# Patient Record
Sex: FEMALE | Race: WHITE | NOT HISPANIC OR LATINO | Employment: FULL TIME | URBAN - METROPOLITAN AREA
[De-identification: names, ages, dates, MRNs, and addresses within clinical notes are randomized per-mention and may not be internally consistent; named-entity substitution may affect disease eponyms.]

---

## 2020-05-25 ENCOUNTER — APPOINTMENT (OUTPATIENT)
Dept: GENERAL RADIOLOGY | Facility: HOSPITAL | Age: 45
End: 2020-05-25

## 2020-05-25 ENCOUNTER — HOSPITAL ENCOUNTER (EMERGENCY)
Facility: HOSPITAL | Age: 45
Discharge: HOME | End: 2020-05-25
Attending: EMERGENCY MEDICINE | Admitting: EMERGENCY MEDICINE

## 2020-05-25 ENCOUNTER — APPOINTMENT (OUTPATIENT)
Dept: CARDIOLOGY | Facility: HOSPITAL | Age: 45
End: 2020-05-25

## 2020-05-25 VITALS
DIASTOLIC BLOOD PRESSURE: 82 MMHG | RESPIRATION RATE: 16 BRPM | HEIGHT: 67 IN | WEIGHT: 293 LBS | SYSTOLIC BLOOD PRESSURE: 129 MMHG | OXYGEN SATURATION: 98 % | HEART RATE: 83 BPM | TEMPERATURE: 98.3 F | BODY MASS INDEX: 45.99 KG/M2

## 2020-05-25 DIAGNOSIS — M23.91 ACUTE INTERNAL DERANGEMENT OF RIGHT KNEE: Primary | ICD-10-CM

## 2020-05-25 LAB
BH CV ECHO MEAS - BSA(HAYCOCK): 2.7 M^2
BH CV ECHO MEAS - BSA: 2.4 M^2
BH CV ECHO MEAS - BZI_BMI: 46.4 KILOGRAMS/M^2
BH CV ECHO MEAS - BZI_METRIC_HEIGHT: 172.7 CM
BH CV ECHO MEAS - BZI_METRIC_WEIGHT: 138.3 KG
BH CV LOWER VASCULAR LEFT COMMON FEMORAL AUGMENT: NORMAL
BH CV LOWER VASCULAR LEFT COMMON FEMORAL COMPRESS: NORMAL
BH CV LOWER VASCULAR LEFT COMMON FEMORAL PHASIC: NORMAL
BH CV LOWER VASCULAR LEFT COMMON FEMORAL SPONT: NORMAL
BH CV LOWER VASCULAR RIGHT COMMON FEMORAL AUGMENT: NORMAL
BH CV LOWER VASCULAR RIGHT COMMON FEMORAL COMPRESS: NORMAL
BH CV LOWER VASCULAR RIGHT COMMON FEMORAL PHASIC: NORMAL
BH CV LOWER VASCULAR RIGHT COMMON FEMORAL SPONT: NORMAL
BH CV LOWER VASCULAR RIGHT DISTAL FEMORAL AUGMENT: NORMAL
BH CV LOWER VASCULAR RIGHT DISTAL FEMORAL COMPRESS: NORMAL
BH CV LOWER VASCULAR RIGHT DISTAL FEMORAL PHASIC: NORMAL
BH CV LOWER VASCULAR RIGHT DISTAL FEMORAL SPONT: NORMAL
BH CV LOWER VASCULAR RIGHT GASTRONEMIUS AUGMENT: NORMAL
BH CV LOWER VASCULAR RIGHT GASTRONEMIUS COMPRESS: NORMAL
BH CV LOWER VASCULAR RIGHT GASTRONEMIUS PHASIC: NORMAL
BH CV LOWER VASCULAR RIGHT GASTRONEMIUS SPONT: NORMAL
BH CV LOWER VASCULAR RIGHT GREATER SAPH AK AUGMENT: NORMAL
BH CV LOWER VASCULAR RIGHT GREATER SAPH AK COMPRESS: NORMAL
BH CV LOWER VASCULAR RIGHT GREATER SAPH AK PHASIC: NORMAL
BH CV LOWER VASCULAR RIGHT GREATER SAPH AK SPONT: NORMAL
BH CV LOWER VASCULAR RIGHT GREATER SAPH BK AUGMENT: NORMAL
BH CV LOWER VASCULAR RIGHT GREATER SAPH BK COMPRESS: NORMAL
BH CV LOWER VASCULAR RIGHT GREATER SAPH BK PHASIC: NORMAL
BH CV LOWER VASCULAR RIGHT GREATER SAPH BK SPONT: NORMAL
BH CV LOWER VASCULAR RIGHT LESSER SAPH AUGMENT: NORMAL
BH CV LOWER VASCULAR RIGHT LESSER SAPH COMPRESS: NORMAL
BH CV LOWER VASCULAR RIGHT LESSER SAPH PHASIC: NORMAL
BH CV LOWER VASCULAR RIGHT LESSER SAPH SPONT: NORMAL
BH CV LOWER VASCULAR RIGHT MID FEMORAL AUGMENT: NORMAL
BH CV LOWER VASCULAR RIGHT MID FEMORAL COMPRESS: NORMAL
BH CV LOWER VASCULAR RIGHT MID FEMORAL PHASIC: NORMAL
BH CV LOWER VASCULAR RIGHT MID FEMORAL SPONT: NORMAL
BH CV LOWER VASCULAR RIGHT PERONEAL AUGMENT: NORMAL
BH CV LOWER VASCULAR RIGHT PERONEAL COMPRESS: NORMAL
BH CV LOWER VASCULAR RIGHT PERONEAL PHASIC: NORMAL
BH CV LOWER VASCULAR RIGHT PERONEAL SPONT: NORMAL
BH CV LOWER VASCULAR RIGHT POPLITEAL AUGMENT: NORMAL
BH CV LOWER VASCULAR RIGHT POPLITEAL COMPRESS: NORMAL
BH CV LOWER VASCULAR RIGHT POPLITEAL PHASIC: NORMAL
BH CV LOWER VASCULAR RIGHT POPLITEAL SPONT: NORMAL
BH CV LOWER VASCULAR RIGHT POSTERIOR TIBIAL AUGMENT: NORMAL
BH CV LOWER VASCULAR RIGHT POSTERIOR TIBIAL COMPRESS: NORMAL
BH CV LOWER VASCULAR RIGHT POSTERIOR TIBIAL PHASIC: NORMAL
BH CV LOWER VASCULAR RIGHT POSTERIOR TIBIAL SPONT: NORMAL
BH CV LOWER VASCULAR RIGHT PROFUNDA FEMORAL AUGMENT: NORMAL
BH CV LOWER VASCULAR RIGHT PROFUNDA FEMORAL COMPRESS: NORMAL
BH CV LOWER VASCULAR RIGHT PROFUNDA FEMORAL PHASIC: NORMAL
BH CV LOWER VASCULAR RIGHT PROFUNDA FEMORAL SPONT: NORMAL
BH CV LOWER VASCULAR RIGHT PROXIMAL FEMORAL AUGMENT: NORMAL
BH CV LOWER VASCULAR RIGHT PROXIMAL FEMORAL COMPRESS: NORMAL
BH CV LOWER VASCULAR RIGHT PROXIMAL FEMORAL PHASIC: NORMAL
BH CV LOWER VASCULAR RIGHT PROXIMAL FEMORAL SPONT: NORMAL
BH CV LOWER VASCULAR RIGHT SAPHENOFEMORAL JUNCTION AUGMENT: NORMAL
BH CV LOWER VASCULAR RIGHT SAPHENOFEMORAL JUNCTION COMPRESS: NORMAL
BH CV LOWER VASCULAR RIGHT SAPHENOFEMORAL JUNCTION PHASIC: NORMAL
BH CV LOWER VASCULAR RIGHT SAPHENOFEMORAL JUNCTION SPONT: NORMAL
BH CV LOWER VASCULAR RIGHT SOLEAL AUGMENT: NORMAL
BH CV LOWER VASCULAR RIGHT SOLEAL COMPRESS: NORMAL
BH CV LOWER VASCULAR RIGHT SOLEAL PHASIC: NORMAL
BH CV LOWER VASCULAR RIGHT SOLEAL SPONT: NORMAL

## 2020-05-25 PROCEDURE — 73560 X-RAY EXAM OF KNEE 1 OR 2: CPT

## 2020-05-25 PROCEDURE — 93971 EXTREMITY STUDY: CPT

## 2020-05-25 PROCEDURE — 99284 EMERGENCY DEPT VISIT MOD MDM: CPT

## 2020-05-25 PROCEDURE — 93971 EXTREMITY STUDY: CPT | Performed by: INTERNAL MEDICINE

## 2020-05-25 NOTE — ED PROVIDER NOTES
Subjective   Ms. Romero 45-year-old female presents to the emergency room today having pain in her right knee.  She states the pain is more severe when she bears weight, she had had an injury years ago but never had it followed up on.  She states that she was seen at the [facility] today they sent her here to rule out a blood clot.  Patient states she is never had a blood clot.  She does not smoke she does not have a history of birth control she does not have a history of cancer is not been immobilized.  Her pain is worse with weightbearing and some range of motion there is no crepitus but has felt unstable.      History provided by:  Patient   used: No    Knee Pain   Location:  Knee  Injury: no    Pain details:     Quality:  Aching and dull    Radiates to:  Does not radiate    Severity:  Moderate    Onset quality:  Gradual    Timing:  Intermittent    Progression:  Waxing and waning  Chronicity:  New  Dislocation: no    Foreign body present:  No foreign bodies  Tetanus status:  Unknown  Prior injury to area:  Yes  Relieved by:  Nothing  Worsened by:  Bearing weight  Ineffective treatments:  None tried  Associated symptoms: no decreased ROM, no fever, no itching, no neck pain, no stiffness, no swelling and no tingling    Risk factors: obesity    Risk factors: no frequent fractures and no known bone disorder        Review of Systems   Constitutional: Negative for chills and fever.   Respiratory: Negative for chest tightness, shortness of breath and wheezing.    Cardiovascular: Negative for chest pain and palpitations.   Gastrointestinal: Negative for abdominal pain and vomiting.   Musculoskeletal: Negative for neck pain and stiffness.   Skin: Negative for itching, pallor and rash.   Hematological: Negative for adenopathy.   Psychiatric/Behavioral: Negative.    All other systems reviewed and are negative.      History reviewed. No pertinent past medical history.    Allergies   Allergen Reactions   •  Penicillins Hives       Past Surgical History:   Procedure Laterality Date   • BREAST SURGERY         History reviewed. No pertinent family history.    Social History     Socioeconomic History   • Marital status: Single     Spouse name: Not on file   • Number of children: Not on file   • Years of education: Not on file   • Highest education level: Not on file   Tobacco Use   • Smoking status: Current Every Day Smoker   • Smokeless tobacco: Never Used   Substance and Sexual Activity   • Alcohol use: Yes   • Drug use: Defer   • Sexual activity: Defer           Objective   Physical Exam   Constitutional: She is oriented to person, place, and time. She appears well-developed and well-nourished. No distress.   HENT:   Head: Normocephalic and atraumatic.   Nose: Nose normal.   Eyes: Conjunctivae are normal. No scleral icterus.   Neck: Normal range of motion. Neck supple.   Pulmonary/Chest: No respiratory distress.   Musculoskeletal: Normal range of motion.   Negative anterior drawer sign.  There is no laxity with valgus or varus.  Posterior tib dorsalis pedis pulses are palpable.  Cap refill less than 2 seconds.   Neurological: She is alert and oriented to person, place, and time.   Skin: Skin is warm and dry. Capillary refill takes less than 2 seconds. She is not diaphoretic.   Psychiatric: She has a normal mood and affect. Her behavior is normal.   Nursing note and vitals reviewed.      Procedures           ED Course  ED Course as of May 25 1411   Mon May 25, 2020   1408 Duplex Doppler was negative.  Yashira the findings with the patient this sounds more like a meniscus type injury with her having pain.  If she is unable to bear weight we will give her crutches will give her NSAIDs have her follow-up with orthopedics.    [PEGGY]      ED Course User Index  [PEGGY] Grayson Juarez PA                                           MDM  Number of Diagnoses or Management Options  Acute internal derangement of right knee: new and  requires workup     Amount and/or Complexity of Data Reviewed  Tests in the radiology section of CPT®: ordered and reviewed  Discuss the patient with other providers: yes    Patient Progress  Patient progress: stable      Final diagnoses:   Acute internal derangement of right knee       Recent Results (from the past 24 hour(s))   Duplex Venous Lower Extremity - Right CAR    Collection Time: 05/25/20  1:46 PM   Result Value Ref Range    BSA 2.4 m^2     CV ECHO RED - BZI_BMI 46.4 kilograms/m^2     CV ECHO RED - BSA([place]) 2.7 m^2     CV ECHO RED - BZI_METRIC_WEIGHT 138.3 kg     CV ECHO RED - BZI_METRIC_HEIGHT 172.7 cm    Right Common Femoral Spont Y     Right Common Femoral Phasic Y     Right Common Femoral Augment Y     Right Common Femoral Compress C     Right Saphenofemoral Junction Spont Y     Right Saphenofemoral Junction Phasic Y     Right Saphenofemoral Junction Augment Y     Right Saphenofemoral Junction Compress C     Right Profunda Femoral Spont Y     Right Profunda Femoral Phasic Y     Right Profunda Femoral Augment Y     Right Profunda Femoral Compress C     Right Proximal Femoral Spont Y     Right Proximal Femoral Phasic Y     Right Proximal Femoral Augment Y     Right Proximal Femoral Compress C     Right Mid Femoral Spont Y     Right Mid Femoral Phasic Y     Right Mid Femoral Augment Y     Right Mid Femoral Compress C     Right Distal Femoral Spont Y     Right Distal Femoral Phasic Y     Right Distal Femoral Augment Y     Right Distal Femoral Compress C     Right Popliteal Spont Y     Right Popliteal Phasic Y     Right Popliteal Augment Y     Right Popliteal Compress C     Right Posterior Tibial Spont Y     Right Posterior Tibial Phasic Y     Right Posterior Tibial Augment Y     Right Posterior Tibial Compress C     Right Peroneal Spont Y     Right Peroneal Phasic Y     Right Peroneal Augment Y     Right Peroneal Compress C     Right GastronemiusSoleal Spont Y     Right GastronemiusSoleal  "Phasic Y     Right GastronemiusSoleal Augment Y     Right GastronemiusSoleal Compress C     Right Greater Saph AK Spont Y     Right Greater Saph AK Phasic Y     Right Greater Saph AK Augment Y     Right Greater Saph AK Compress C     Right Greater Saph BK Spont Y     Right Greater Saph BK Phasic Y     Right Greater Saph BK Augment Y     Right Greater Saph BK Compress C     Right Lesser Saph Spont Y     Right Lesser Saph Phasic Y     Right Lesser Saph Augment Y     Right Lesser Saph Compress C     Left Common Femoral Spont Y     Left Common Femoral Phasic Y     Left Common Femoral Augment Y     Left Common Femoral Compress C     RIGHT SOLEAL SPONT Y     RIGHT SOLEAL PHASIC Y     BH CV LOWER VASCULAR RIGHT SOLEAL AUGMENT Y     BH CV LOWER VASCULAR RIGHT SOLEAL COMPRESS C      Note: In addition to lab results from this visit, the labs listed above may include labs taken at another facility or during a different encounter within the last 24 hours. Please correlate lab times with ED admission and discharge times for further clarification of the services performed during this visit.    XR Knee 1 or 2 View Right   Final Result   No acute osseous finding specifically, no acute fracture,   however, moderate-to-large suprapatellar joint effusion noted.       D:  05/25/2020   E:  05/25/2020       This report was finalized on 5/25/2020 1:40 PM by Dr. Eduardo Oliva.            Vitals:    05/25/20 1200 05/25/20 1215 05/25/20 1347 05/25/20 1400   BP: 140/86   129/82   BP Location: Right arm   Right arm   Pulse: 80   83   Resp: 16   16   Temp:       TempSrc: Oral      SpO2: 97% 98%  95%   Weight:   (!) 138 kg (305 lb)    Height:   170 cm (66.93\")      Medications - No data to display  ECG/EMG Results (last 24 hours)     ** No results found for the last 24 hours. **        No orders to display            Grayson Juarez PA  05/28/20 0717    "

## 2021-09-08 ENCOUNTER — OFFICE VISIT (OUTPATIENT)
Dept: INTERNAL MEDICINE | Facility: CLINIC | Age: 46
End: 2021-09-08

## 2021-09-08 VITALS
HEART RATE: 83 BPM | RESPIRATION RATE: 16 BRPM | OXYGEN SATURATION: 98 % | SYSTOLIC BLOOD PRESSURE: 142 MMHG | TEMPERATURE: 97.3 F | BODY MASS INDEX: 62 KG/M2 | DIASTOLIC BLOOD PRESSURE: 102 MMHG | WEIGHT: 293 LBS

## 2021-09-08 DIAGNOSIS — M25.561 ACUTE PAIN OF RIGHT KNEE: Primary | ICD-10-CM

## 2021-09-08 DIAGNOSIS — R03.0 ELEVATED BLOOD PRESSURE READING: ICD-10-CM

## 2021-09-08 PROCEDURE — 99214 OFFICE O/P EST MOD 30 MIN: CPT | Performed by: NURSE PRACTITIONER

## 2021-09-08 NOTE — PATIENT INSTRUCTIONS
Advance Care Planning and Advance Directives     You make decisions on a daily basis - decisions about where you want to live, your career, your home, your life. Perhaps one of the most important decisions you face is your choice for future medical care. Take time to talk with your family and your healthcare team and start planning today.  Advance Care Planning is a process that can help you:  · Understand possible future healthcare decisions in light of your own experiences  · Reflect on those decision in light of your goals and values  · Discuss your decisions with those closest to you and the healthcare professionals that care for you  · Make a plan by creating a document that reflects your wishes    Surrogate Decision Maker  In the event of a medical emergency, which has left you unable to communicate or to make your own decisions, you would need someone to make decisions for you.  It is important to discuss your preferences for medical treatment with this person while you are in good health.     Qualities of a surrogate decision maker:  • Willing to take on this role and responsibility  • Knows what you want for future medical care  • Willing to follow your wishes even if they don't agree with them  • Able to make difficult medical decisions under stressful circumstances    Advance Directives  These are legal documents you can create that will guide your healthcare team and decision maker(s) when needed. These documents can be stored in the electronic medical record.    · Living Will - a legal document to guide your care if you have a terminal condition or a serious illness and are unable to communicate. States vary by statute in document names/types, but most forms may include one or more of the following:        -  Directions regarding life-prolonging treatments        -  Directions regarding artificially provided nutrition/hydration        -  Choosing a healthcare decision maker        -  Direction  regarding organ/tissue donation    · Durable Power of  for Healthcare - this document names an -in-fact to make medical decisions for you, but it may also allow this person to make personal and financial decisions for you. Please seek the advice of an  if you need this type of document.    **Advance Directives are not required and no one may discriminate against you if you do not sign one.    Medical Orders  Many states allow specific forms/orders signed by your physician to record your wishes for medical treatment in your current state of health. This form, signed in personal communication with your physician, addresses resuscitation and other medical interventions that you may or may not want.      For more information or to schedule a time with a [facility] Advance Care Planning Facilitator contact: [facility] or call [phone] and someone will contact you directly.    MyPlate from USDA    MyPlate is an outline of a general healthy diet based on the 2010 Dietary Guidelines for Americans, from the U.S. Department of Agriculture (USDA). It sets guidelines for how much food you should eat from each food group based on your age, sex, and level of physical activity.  What are tips for following MyPlate?  To follow MyPlate recommendations:  · Eat a wide variety of fruits and vegetables, grains, and protein foods.  · Serve smaller portions and eat less food throughout the day.  · Limit portion sizes to avoid overeating.  · Enjoy your food.  · Get at least 150 minutes of exercise every week. This is about 30 minutes each day, 5 or more days per week.  It can be difficult to have every meal look like MyPlate. Think about MyPlate as eating guidelines for an entire day, rather than each individual meal.  Fruits and vegetables  · Make half of your plate fruits and vegetables.  · Eat many different colors of fruits and vegetables each day.  · For a 2,000 calorie daily food plan,  eat:  ? 2½ cups of vegetables every day.  ? 2 cups of fruit every day.  · 1 cup is equal to:  ? 1 cup raw or cooked vegetables.  ? 1 cup raw fruit.  ? 1 medium-sized orange, apple, or banana.  ? 1 cup 100% fruit or vegetable juice.  ? 2 cups raw leafy greens, such as lettuce, spinach, or kale.  ? ½ cup dried fruit.  Grains  · One fourth of your plate should be grains.  · Make at least half of the grains you eat each day whole grains.  · For a 2,000 calorie daily food plan, eat 6 oz of grains every day.  · 1 oz is equal to:  ? 1 slice bread.  ? 1 cup cereal.  ? ½ cup cooked rice, cereal, or pasta.  Protein  · One fourth of your plate should be protein.  · Eat a wide variety of protein foods, including meat, poultry, fish, eggs, beans, nuts, and tofu.  · For a 2,000 calorie daily food plan, eat 5½ oz of protein every day.  · 1 oz is equal to:  ? 1 oz meat, poultry, or fish.  ? ¼ cup cooked beans.  ? 1 egg.  ? ½ oz nuts or seeds.  ? 1 Tbsp peanut butter.  Dairy  · Drink fat-free or low-fat (1%) milk.  · Eat or drink dairy as a side to meals.  · For a 2,000 calorie daily food plan, eat or drink 3 cups of dairy every day.  · 1 cup is equal to:  ? 1 cup milk, yogurt, cottage cheese, or soy milk (soy beverage).  ? 2 oz processed cheese.  ? 1½ oz natural cheese.  Fats, oils, salt, and sugars  · Only small amounts of oils are recommended.  · Avoid foods that are high in calories and low in nutritional value (empty calories), like foods high in fat or added sugars.  · Choose foods that are low in salt (sodium). Choose foods that have less than 140 milligrams (mg) of sodium per serving.  · Drink water instead of sugary drinks. Drink enough water each day to keep your urine pale yellow.  Where to find support  · Work with your health care provider or a nutrition specialist (dietitian) to develop a customized eating plan that is right for you.  · Download an tati (mobile application) to help you track your daily food  intake.  Where to find more information  · Go to ChooseMyPlate.gov for more information.  Summary  · MyPlate is a general guideline for healthy eating from the USDA. It is based on the 2010 Dietary Guidelines for Americans.  · In general, fruits and vegetables should take up ½ of your plate, grains should take up ¼ of your plate, and protein should take up ¼ of your plate.  This information is not intended to replace advice given to you by your health care provider. Make sure you discuss any questions you have with your health care provider.  Document Revised: 05/21/2020 Document Reviewed: 03/19/2018  Elsevier Patient Education © 2021 Elsevier Inc.    ACP information pamphlet given to the patient.  ACP information provided on the AVS.

## 2021-09-08 NOTE — PROGRESS NOTES
swimmin gloast 42 # in last 6 mo   renan fell off toilet tryin to kill spider and reinjured 1 yr ago and over compasate to L side then L side sore and   1 yr ago tryin gto get a dog out of pool  Given nsaids and roatated motrin and tylenol.   Seen in er no dvt xray normal  Derm referral   Medrol pt for knee   B knee with fuid R>L  R knner knee change white to red 4mm neds derm   bp recheck one month

## 2021-09-08 NOTE — PROGRESS NOTES
Chief Complaint  Joint Swelling (Rt knee. Establish care)    Subjective          Neida Romero presents to [facility]  History of Present Illness  The patient comes to clinic today to establish care.    Patient has new symptom today right knee pain swelling    Past medical history includes GERD which is not exacerbated takes medication as needed over-the-counter..    The patient is notable she is here with her blood pressure is elevated she is had mild elevations in the past.  She denies chest pain shortness of breath headache dizziness palpitation visual changes or presyncope or syncope episodes.    Allergies to penicillin    Health screenings ophthalmology exam 6/21  Occupation lead  single 5 drinks per week no illicit drug use current smoker since 2000 1/3 pack cigarettes daily    Surgical history includes breast reduction 2001    Family medical history includes grandparent with arthritis and mother with diabetes    Review of systems no headache dizziness passing out chest pain shortness of breath or swelling no visual changes no palpitations no abdominal symptoms no new lumps bumps or rashes she does have positive right knee pain.  Objective   Vital Signs:   BP (!) 142/102 (BP Location: Right arm, Patient Position: Sitting, Cuff Size: Large Adult) Comment: rechecked Lt arm sitting 132/94  Pulse 83   Temp 97.3 °F (36.3 °C) (Temporal)   Resp 16   Wt (!) 179 kg (395 lb)   SpO2 98%   BMI 62.00 kg/m²     Physical Exam  Vitals and nursing note reviewed.   Constitutional:       Appearance: She is well-developed.   HENT:      Head: Normocephalic and atraumatic.   Cardiovascular:      Rate and Rhythm: Normal rate and regular rhythm.   Pulmonary:      Effort: Pulmonary effort is normal.      Breath sounds: Normal breath sounds.   Musculoskeletal:      Comments: Right knee noted positive swelling negative valgus varus negative anterior posterior  drawer sign.  Patient is able to bear weight on the knee.  There is no warmth or erythema no bruising.    Left knee with positive swelling noted as well.   Skin:     General: Skin is warm and dry.      Capillary Refill: Capillary refill takes 2 to 3 seconds.   Neurological:      Mental Status: She is alert and oriented to person, place, and time.   Psychiatric:         Behavior: Behavior normal.        Result Review :                 Assessment and Plan    Diagnoses and all orders for this visit:    1. Acute pain of right knee (Primary)  -     XR Knee 1 or 2 View Right; Future  -     Ambulatory Referral to Physical Therapy    Other orders  -     methylPREDNISolone (MEDROL) 4 MG dose pack; Take as directed on package instructions.  Dispense: 1 each; Refill: 0      Discussed with patient option of having possible orthopedic evaluation or MRI if symptoms not improving she will let us know.    Blood pressure is elevated today however the last 2 blood pressure checks she has had has been when she is sick or hurting.  Of advised her to check her blood pressure at home and if her numbers are 90 normal range she will let us know.  Encouraged a DASH diet.  Follow-up in the next few weeks for fasting physical recheck of blood pressure and need.          Discussed the patient's BMI with her. BMI is above normal parameters. Recommendations include: educational material.    AWV: N/A  A1C: No results found for: HGBA1C   ACP: Advance Care Planning   ACP discussion was held with the patient during this visit. Patient does not have an advance directive, information provided.       Follow Up   Return in about 1 month (around 10/8/2021) for fasting.  Patient was given instructions and counseling regarding her condition or for health maintenance advice. Please see specific information pulled into the AVS if appropriate.     RTC/call  If symptoms worsen  Meds MOA and SE's reviewed and pt v/u

## 2021-09-09 ENCOUNTER — TELEPHONE (OUTPATIENT)
Dept: INTERNAL MEDICINE | Facility: CLINIC | Age: 46
End: 2021-09-09

## 2021-09-09 RX ORDER — METHYLPREDNISOLONE 4 MG/1
TABLET ORAL
Qty: 1 EACH | Refills: 0 | Status: SHIPPED | OUTPATIENT
Start: 2021-09-09 | End: 2021-10-20

## 2021-09-09 NOTE — TELEPHONE ENCOUNTER
PT CALLED IN REGARDS TO A MEDICATION  THAT WAS SUPPOSE TO BE SENT TO PHARMACY FOR SWELLING IN PT KNEE.    PLEASE ADVISE.  CALL BACK:[phone]    [facility]/pharmacy #[id number] - [place], [place] - [address]

## 2021-09-10 ENCOUNTER — HOSPITAL ENCOUNTER (OUTPATIENT)
Dept: PHYSICAL THERAPY | Facility: HOSPITAL | Age: 46
Setting detail: THERAPIES SERIES
Discharge: HOME | End: 2021-09-10

## 2021-09-10 DIAGNOSIS — M25.561 ACUTE PAIN OF RIGHT KNEE: Primary | ICD-10-CM

## 2021-09-10 PROCEDURE — 97161 PT EVAL LOW COMPLEX 20 MIN: CPT

## 2021-09-10 NOTE — THERAPY EVALUATION
Outpatient Physical Therapy Ortho Initial Evaluation  [facility]     Patient Name: Neida Romero  : 1975  MRN: [id number]  Today's Date: 9/10/2021      Visit Date: 09/10/2021    There is no problem list on file for this patient.       Past Medical History:   Diagnosis Date   • Acid reflux         Past Surgical History:   Procedure Laterality Date   • BREAST SURGERY         Visit Dx:     ICD-10-CM ICD-9-CM   1. Acute pain of right knee  M25.561 719.46         Patient History     Row Name 09/10/21 1100             History    Chief Complaint  Pain  -LF      Type of Pain  Knee pain  -LF      Brief Description of Current Complaint  Neida presents with ongoing right knee pain.  She says she initially injured her knee 10 years ago when she lost balance and landed on it.  Then reinjured it last May when she slipped in a pool and krystal her knee.  Had increased swelling at the time.  Went to [facility] and ER and work-up showed no fracture or DVT.  Symptoms unchanged since onset.  Constant pain, but intensity varies.   -LF      Patient/Caregiver Goals  Relieve pain;Return to prior level of function;Decrease swelling  -LF      Occupation/sports/leisure activities   at [employer]; has been trying to walk regularly to help with weight loss  -LF         Pain     Pain Location  Knee  -LF      Pain at Present  2  -LF      Pain at Worst  6;7  -LF      Pain Frequency  Constant/continuous  -LF      Pain Description  Aching;Discomfort;Sharp;Dull fatigue  -LF      What Performance Factors Make the Current Problem(s) WORSE?  walking, squatting, pressure, prolonged standing, stairs  -LF      What Performance Factors Make the Current Problem(s) BETTER?  tylenol and ibuprofen, ice  -LF         Fall Risk Assessment    Any falls in the past year:  No  -LF         Daily Activities    Primary Language  English  -LF      Are you able to read  Yes  -LF      Are you able to write  Yes  -LF      Barriers to learning  None  -LF       Pt Participated in POC and Goals  Yes  -LF        User Key  (r) = Recorded By, (t) = Taken By, (c) = Cosigned By    Initials Name Provider Type    LF Almaz Gonzalez PT Physical Therapist          PT Ortho     Row Name 09/10/21 1100       Special Tests/Palpation    Special Tests/Palpation  Knee  -LF       Knee Special Tests    Anterior drawer (ACL lesion)  Right:;Negative  -LF    Posterior drawer (PCL lesion)  Right:;Negative  -LF    Valgus stress (MCL lesion)  Right:;Negative  -LF    Varus stress (LCL lesion)  Right:;Negative  -LF    Deysi’s test (meniscal lesion)  Right:;Negative  -LF    Patellar grind test (chondromalacia patella)  Right:;Negative  -LF    Knee Special Tests Comments  No tenderness to palpation at knee in neutral or flexed position.  Mild pocket edema right medial knee  -LF       General ROM    RT Lower Ext  Rt Knee Extension/Flexion  -LF    LT Lower Ext  Lt Knee Extension/Flexion  -LF       Right Lower Ext    Rt Knee Extension/Flexion AROM  0-  -LF       Left Lower Ext    Lt Knee Extension/Flexion AROM  0-118  -LF       MMT (Manual Muscle Testing)    Rt Lower Ext  Rt Hip Flexion;Rt Hip ABduction;Rt Hip ADduction;Rt Knee Extension;Rt Knee Flexion;Rt Ankle Plantarflexion;Rt Ankle Dorsiflexion  -LF    Lt Lower Ext  Lt Hip Flexion;Lt Hip ABduction;Lt Hip ADduction;Lt Knee Extension;Lt Knee Flexion;Lt Ankle Plantarflexion;Lt Ankle Dorsiflexion  -LF       MMT Right Lower Ext    Rt Hip Flexion MMT, Gross Movement  (4-/5) good minus  -LF    Rt Hip ABduction MMT, Gross Movement  (4+/5) good plus  -LF    Rt Hip ADduction MMT, Gross Movement  (4+/5) good plus  -LF    Rt Knee Extension MMT, Gross Movement  (5/5) normal  -LF    Rt Knee Flexion MMT, Gross Movement  (5/5) normal  -LF    Rt Ankle Plantarflexion MMT, Gross Movement  (5/5) normal  -LF    Rt Ankle Dorsiflexion MMT, Gross Movement  (5/5) normal  -LF       MMT Left Lower Ext    Lt Hip Flexion MMT, Gross Movement  (4-/5) good minus  -LF     Lt Hip ABduction MMT, Gross Movement  (4+/5) good plus  -LF    Lt Hip ADduction MMT, Gross Movement  (4+/5) good plus  -LF    Lt Knee Extension MMT, Gross Movement  (5/5) normal  -LF    Lt Knee Flexion MMT, Gross Movement  (5/5) normal  -LF       Sensation    Sensation WNL?  WNL  -LF       Flexibility    Flexibility Tested?  Lower Extremity  -LF       Lower Extremity Flexibility    Hamstrings  Right:;Mildly limited  -LF    Quadriceps  Right:;Mildly limited  -LF      User Key  (r) = Recorded By, (t) = Taken By, (c) = Cosigned By    Initials Name Provider Type    LF Almaz Gonzalez, PT Physical Therapist            Therapy Education  Education Details: HEP provided for QS, SLR, heel slides, HS stretch  Given: HEP, Symptoms/condition management  Program: New  How Provided: Verbal, Demonstration, Written  Provided to: Patient  Level of Understanding: Teach back education performed, Verbalized, Demonstrated     PT OP Goals     Row Name 09/10/21 1100          PT Short Term Goals    STG Date to Achieve  10/01/21  -LF     STG 1  Patient to report 50% improvement in knee pain.  -LF     STG 1 Progress  New  -LF     STG 2  R knee ROM improved to 0-115 to promote improved gait and mobility.  -LF     STG 2 Progress  New  -LF        Long Term Goals    LTG Date to Achieve  11/05/21  -LF     LTG 1  Patient independent with HEP for management of symptoms included ther ex  for improved strength and flexbility.  -LF     LTG 1 Progress  New  -LF     LTG 2  LEFS score improved by at least 15 points.  -LF     LTG 2 Progress  New  -LF     LTG 3  Patient able to stand/walk at least 20 minutes with knee pain no greater than 2/10  -LF     LTG 3 Progress  New  -LF        Time Calculation    PT Goal Re-Cert Due Date  12/09/21  -LF       User Key  (r) = Recorded By, (t) = Taken By, (c) = Cosigned By    Initials Name Provider Type    LF Almaz Gonzalez PT Physical Therapist          PT Assessment/Plan     Row Name 09/10/21 1100           PT Assessment    Functional Limitations  Impaired gait;Performance in leisure activities;Limitations in community activities  -LF     Impairments  Edema;Gait;Impaired flexibility;Muscle strength;Pain;Range of motion  -LF     Assessment Comments  Patient presents with ongoing right knee pain and swelling, along with decreased strength, flexibility, and ROM.  No tenderness with palaption, and no ligamentous laxity with special test.  Possible meniscal issue vs. patellofemoral pain.  Will benefit from continued treatment to minimize pain and improve mobility.    -LF     Please refer to paper survey for additional self-reported information  Yes  -LF     Rehab Potential  Good  -LF     Patient/caregiver participated in establishment of treatment plan and goals  Yes  -LF     Patient would benefit from skilled therapy intervention  Yes  -LF        PT Plan    PT Frequency  2x/week  -LF     Planned CPT's?  PT EVAL LOW COMPLEXITY: 92744;PT RE-EVAL: 15721;PT THER PROC EA 15 MIN: 56582;PT THER ACT EA 15 MIN: 28670;PT MANUAL THERAPY EA 15 MIN: 84424;PT SELF CARE/HOME MGMT/TRAIN EA 15: 77722;PT ULTRASOUND EA 15 MIN: 06051;PT HOT/COLD PACK WC NONMCARE: 49306  -LF     PT Plan Comments  cont. per POC. Progress ther ex for strength, flexibility, and ROM.  consider trial of ASTYM  -LF       User Key  (r) = Recorded By, (t) = Taken By, (c) = Cosigned By    Initials Name Provider Type    Almaz Guerra, PT Physical Therapist          Outcome Measure Options: Lower Extremity Functional Scale (LEFS)  Lower Extremity Functional Index  Any of your usual work, housework or school activities: A little bit of difficulty  Your usual hobbies, recreational or sporting activities: A little bit of difficulty  Getting into or out of the bath: No difficulty  Walking between rooms: No difficulty  Putting on your shoes or socks: No difficulty  Squatting: Extreme difficulty or unable to perform activity  Lifting an object, like a bag of groceries  from the floor: No difficulty  Performing light activities around your home: No difficulty  Performing heavy activities around your home: A little bit of difficulty  Getting into or out of a car: A little bit of difficulty  Walking 2 blocks: No difficulty  Walking a mile: A little bit of difficulty  Going up or down 10 stairs (about 1 flight of stairs): A little bit of difficulty  Standing for 1 hour: Moderate difficulty  Sitting for 1 hour: A little bit of difficulty  Running on even ground: Moderate difficulty  Running on uneven ground: Quite a bit of difficulty  Making sharp turns while running fast: Extreme difficulty or unable to perform activity  Hopping: Extreme difficulty or unable to perform activity  Rolling over in bed: A little bit of difficulty  Total: 53      Time Calculation:     Start Time: 1110  Untimed Charges  PT Eval/Re-eval Minutes: 65  Total Minutes  Untimed Charges Total Minutes: 65   Total Minutes: 65     Therapy Charges for Today     Code Description Service Date Service Provider Modifiers Qty    22527279288 HC PT EVAL LOW COMPLEXITY 4 9/10/2021 Almaz Gonzalez, PT GP 1          PT G-Codes  Outcome Measure Options: Lower Extremity Functional Scale (LEFS)  Total: 53         Almaz Gonzalez PT  9/10/2021

## 2021-09-14 ENCOUNTER — HOSPITAL ENCOUNTER (OUTPATIENT)
Dept: PHYSICAL THERAPY | Facility: HOSPITAL | Age: 46
Setting detail: THERAPIES SERIES
Discharge: HOME | End: 2021-09-14

## 2021-09-14 DIAGNOSIS — M25.561 ACUTE PAIN OF RIGHT KNEE: Primary | ICD-10-CM

## 2021-09-14 PROCEDURE — 97110 THERAPEUTIC EXERCISES: CPT

## 2021-09-14 PROCEDURE — 97140 MANUAL THERAPY 1/> REGIONS: CPT

## 2021-09-14 NOTE — THERAPY TREATMENT NOTE
Outpatient Physical Therapy Ortho Treatment Note  [facility]     Patient Name: Neida Romero  : 1975  MRN: [id number]  Today's Date: 2021      Visit Date: 2021    Visit Dx:    ICD-10-CM ICD-9-CM   1. Acute pain of right knee  M25.561 719.46       There is no problem list on file for this patient.       Past Medical History:   Diagnosis Date   • Acid reflux         Past Surgical History:   Procedure Laterality Date   • BREAST SURGERY           PT Assessment/Plan     Row Name 21 0825          PT Assessment    Assessment Comments  Tolerates ther ex and AStYM without complaint.  Decrease soreness post-treatment  -LF        PT Plan    PT Plan Comments  cont. per POC  -LF       User Key  (r) = Recorded By, (t) = Taken By, (c) = Cosigned By    Initials Name Provider Type    Almaz Guerra, PT Physical Therapist            OP Exercises     Row Name 21             Subjective Comments    Subjective Comments  mowed yard last night and knee is a little sore  -LF         Subjective Pain    Able to rate subjective pain?  yes  -LF      Pre-Treatment Pain Level  4  -LF      Post-Treatment Pain Level  2  -LF         Total Minutes    11645 - PT Therapeutic Exercise Minutes  20  -LF      18968 - PT Manual Therapy Minutes  10  -LF         Exercise 1    Exercise Name 1  NuStep L4  -LF      Time 1  5'  -LF         Exercise 2    Exercise Name 2  standing 3-way kicks red band  -LF      Reps 2  10 each  -LF         Exercise 3    Exercise Name 3  side step red band  -LF      Reps 3  2 laps  -LF         Exercise 4    Exercise Name 4  bridge  -LF      Reps 4  10  -LF         Exercise 5    Exercise Name 5  SLR  -LF      Reps 5  10  -LF         Exercise 6    Exercise Name 6  single-leg HS curl white SB  -LF      Reps 6  12  -LF         Exercise 7    Exercise Name 7  table-top add ball squeeze (supported on SB)  -LF      Reps 7  10  -LF         Exercise 8    Exercise Name 8  TKE red band  -LF       Reps 8  10  -LF         Exercise 9    Exercise Name 9  quad and HS stretches w/ strap  -LF      Time 9  5'  -LF        User Key  (r) = Recorded By, (t) = Taken By, (c) = Cosigned By    Initials Name Provider Type    LF Almaz Gonzalez PT Physical Therapist                Manual Rx (last 36 hours)      Manual Treatments     Row Name 09/14/21 0825             Total Minutes    86612 - PT Manual Therapy Minutes  10  -LF         Manual Rx 1    Manual Rx 1 Location  R knee  -LF      Manual Rx 1 Type  STM including ASTYM to right quad and knee region.  mild-moderate pressure applied  -LF        User Key  (r) = Recorded By, (t) = Taken By, (c) = Cosigned By    Initials Name Provider Type    LF Almaz Gonzalez PT Physical Therapist              Therapy Education  Education Details: HEP updated with 3-way kicks and side step red band  Given: HEP  Program: Reinforced, Progressed  How Provided: Verbal, Demonstration  Provided to: Patient  Level of Understanding: Teach back education performed, Verbalized, Demonstrated              Time Calculation:   Start Time: 0825  Timed Charges  48049 - PT Therapeutic Exercise Minutes: 20  75604 - PT Manual Therapy Minutes: 10  Total Minutes  Timed Charges Total Minutes: 30   Total Minutes: 30  Therapy Charges for Today     Code Description Service Date Service Provider Modifiers Qty    07256556978 HC PT THER PROC EA 15 MIN 9/14/2021 Almaz Gonzalez, PT GP 1    34241773503 HC PT MANUAL THERAPY EA 15 MIN 9/14/2021 Almaz Gonzalez, PT GP 1                    Almaz Gonzalez PT  9/14/2021

## 2021-09-23 ENCOUNTER — HOSPITAL ENCOUNTER (OUTPATIENT)
Dept: PHYSICAL THERAPY | Facility: HOSPITAL | Age: 46
Setting detail: THERAPIES SERIES
Discharge: HOME | End: 2021-09-23

## 2021-09-23 DIAGNOSIS — M25.561 ACUTE PAIN OF RIGHT KNEE: Primary | ICD-10-CM

## 2021-09-23 PROCEDURE — 97140 MANUAL THERAPY 1/> REGIONS: CPT

## 2021-09-23 PROCEDURE — 97110 THERAPEUTIC EXERCISES: CPT

## 2021-09-23 NOTE — THERAPY TREATMENT NOTE
"    Outpatient Physical Therapy Ortho Treatment Note  [facility]     Patient Name: Neida Romero  : 1975  MRN: [id number]  Today's Date: 2021      Visit Date: 2021    Visit Dx:    ICD-10-CM ICD-9-CM   1. Acute pain of right knee  M25.561 719.46       There is no problem list on file for this patient.       Past Medical History:   Diagnosis Date   • Acid reflux         Past Surgical History:   Procedure Laterality Date   • BREAST SURGERY           PT Assessment/Plan     Row Name 2145          PT Assessment    Assessment Comments  Decreased symptoms following ASTYM.  No significant point tenderness at patella or patellar tendon  -LF        PT Plan    PT Plan Comments  cont. per POC including ther ex and ASTYM  -LF       User Key  (r) = Recorded By, (t) = Taken By, (c) = Cosigned By    Initials Name Provider Type    Almaz Guerra, PT Physical Therapist            OP Exercises     Row Name 2158             Subjective Comments    Subjective Comments  Has been noticing pain front of her knee, especially when going down stairs  -LF         Subjective Pain    Able to rate subjective pain?  yes  -LF      Pre-Treatment Pain Level  4  -LF      Post-Treatment Pain Level  2  -LF         Total Minutes    47800 - PT Therapeutic Exercise Minutes  20  -LF      34429 - PT Manual Therapy Minutes  12  -LF         Exercise 1    Exercise Name 1  NuStep L4  -LF      Time 1  5'  -LF         Exercise 2    Exercise Name 2  standing 3-way kicks red band  -LF      Reps 2  10 each  -LF         Exercise 3    Exercise Name 3  HS curl w/ SB  -LF      Reps 3  15  -LF         Exercise 4    Exercise Name 4  bridge with feet on stability ball  -LF      Sets 4  2  -LF      Reps 4  10  -LF         Exercise 5    Exercise Name 5  SLR  -LF      Sets 5  2  -LF      Reps 5  10  -LF         Exercise 6    Exercise Name 6  TKE 4\" step  -LF      Reps 6  10  -LF         Exercise 7    Exercise Name 7  sit<>stand   " -LF      Reps 7  10  -LF         Exercise 8    Exercise Name 8  partial squats  -LF      Reps 8  10  -LF         Exercise 9    Exercise Name 9  quad and HS stretches w/ strap  -LF      Time 9  4'  -LF        User Key  (r) = Recorded By, (t) = Taken By, (c) = Cosigned By    Initials Name Provider Type    LF Almaz Gonzalez PT Physical Therapist                Manual Rx (last 36 hours)      Manual Treatments     Row Name 09/23/21 0945             Total Minutes    69078 - PT Manual Therapy Minutes  12  -LF         Manual Rx 1    Manual Rx 1 Location  R knee  -LF      Manual Rx 1 Type  STM including ASTYM to right quad and knee region.  mild-moderate pressure applied  -LF        User Key  (r) = Recorded By, (t) = Taken By, (c) = Cosigned By    Initials Name Provider Type    LF Almaz Gonzalez PT Physical Therapist            Time Calculation:   Start Time: 0945  Timed Charges  06658 - PT Therapeutic Exercise Minutes: 20  90839 - PT Manual Therapy Minutes: 12  Total Minutes  Timed Charges Total Minutes: 32   Total Minutes: 32  Therapy Charges for Today     Code Description Service Date Service Provider Modifiers Qty    14338742551 HC PT THER PROC EA 15 MIN 9/23/2021 Almaz Gonzalez, PT GP 1    27244744975 HC PT MANUAL THERAPY EA 15 MIN 9/23/2021 Almaz Gonzalez, PT GP 1                    Almaz Gonzalez PT  9/23/2021

## 2021-09-28 ENCOUNTER — HOSPITAL ENCOUNTER (OUTPATIENT)
Dept: PHYSICAL THERAPY | Facility: HOSPITAL | Age: 46
Setting detail: THERAPIES SERIES
Discharge: HOME | End: 2021-09-28

## 2021-09-28 DIAGNOSIS — M25.561 ACUTE PAIN OF RIGHT KNEE: Primary | ICD-10-CM

## 2021-09-28 PROCEDURE — 97110 THERAPEUTIC EXERCISES: CPT

## 2021-09-28 PROCEDURE — 97140 MANUAL THERAPY 1/> REGIONS: CPT

## 2021-09-28 NOTE — THERAPY TREATMENT NOTE
Outpatient Physical Therapy Ortho Treatment Note  [facility]     Patient Name: Neida Romero  : 1975  MRN: [id number]  Today's Date: 2021      Visit Date: 2021    Visit Dx:    ICD-10-CM ICD-9-CM   1. Acute pain of right knee  M25.561 719.46       There is no problem list on file for this patient.       Past Medical History:   Diagnosis Date   • Acid reflux         Past Surgical History:   Procedure Laterality Date   • BREAST SURGERY             PT Assessment/Plan     Row Name 21 0800          PT Assessment    Assessment Comments  tolerates ther ex without increased symptoms, and decreased symptoms following ASTYM.  Incorporated taping today for support at patella to see if this helps with distal knee pain  -LF        PT Plan    PT Plan Comments  follow-up again this week  -LF       User Key  (r) = Recorded By, (t) = Taken By, (c) = Cosigned By    Initials Name Provider Type    LF Almaz Gonzalez, PT Physical Therapist            OP Exercises     Row Name 21 0815 09/28/21 08          Subjective Comments    Subjective Comments  --  More soreness and stiffness this morning (points to lateral and posterior knee)  -LF        Subjective Pain    Able to rate subjective pain?  --  yes  -LF        Total Minutes    12096 - PT Therapeutic Exercise Minutes  --  25  -LF     69956 - PT Manual Therapy Minutes  20  -LF  --        Exercise 1    Exercise Name 1  --  NuStep L5  -LF     Time 1  --  5'  -LF        Exercise 2    Exercise Name 2  --  leg press green band (supine from tabletop position)  -LF     Reps 2  --  10 each  -LF        Exercise 3    Exercise Name 3  --  HS curl w/ SB  -LF     Reps 3  --  15  -LF        Exercise 4    Exercise Name 4  --  bridge with feet on stability ball  -LF     Reps 4  --  10  -LF        Exercise 5    Exercise Name 5  --  SLR  -LF     Sets 5  --  2  -LF     Reps 5  --  10  -LF        Exercise 6    Exercise Name 6  --  hip add ball squeez in supported  tabletop  -LF     Reps 6  --  10  -LF        Exercise 7    Exercise Name 7  --  lunges onto BOSU  -LF     Reps 7  --  10  -LF        Exercise 8    Exercise Name 8  --  TKE green band  -LF     Reps 8  --  15  -LF        Exercise 9    Exercise Name 9  --  quad and HS stretches w/ strap  -LF       User Key  (r) = Recorded By, (t) = Taken By, (c) = Cosigned By    Initials Name Provider Type    LF Almaz Gonzalez PT Physical Therapist                  Manual Rx (last 36 hours)      Manual Treatments     Row Name 09/28/21 0815             Total Minutes    85414 - PT Manual Therapy Minutes  20  -LF         Manual Rx 1    Manual Rx 1 Location  R knee  -LF      Manual Rx 1 Type  STM including ASTYM to right anterior and posterior knee including distal quad and HS, gastroc areas.  mild-moderate pressure applied  -LF         Manual Rx 2    Manual Rx 2 Location  R knee  -LF      Manual Rx 2 Type  Mconnel taping right knee  -LF        User Key  (r) = Recorded By, (t) = Taken By, (c) = Cosigned By    Initials Name Provider Type    LF Almaz Gonzalez PT Physical Therapist            Time Calculation:   Start Time: 0817  Timed Charges  70932 - PT Therapeutic Exercise Minutes: 25  80077 - PT Manual Therapy Minutes: 20  Total Minutes  Timed Charges Total Minutes: 20   Total Minutes: 20  Therapy Charges for Today     Code Description Service Date Service Provider Modifiers Qty    10311415335  PT THER PROC EA 15 MIN 9/28/2021 Almaz Gonzalez, PT GP 2    [id number]  PT MANUAL THERAPY EA 15 MIN 9/28/2021 Almaz Gonzalez, PT GP 1                    Almaz Gonzalez PT  9/28/2021

## 2021-10-05 ENCOUNTER — HOSPITAL ENCOUNTER (OUTPATIENT)
Dept: PHYSICAL THERAPY | Facility: HOSPITAL | Age: 46
Setting detail: THERAPIES SERIES
Discharge: HOME | End: 2021-10-05

## 2021-10-05 DIAGNOSIS — M25.561 ACUTE PAIN OF RIGHT KNEE: Primary | ICD-10-CM

## 2021-10-05 PROCEDURE — 97110 THERAPEUTIC EXERCISES: CPT

## 2021-10-05 PROCEDURE — 97140 MANUAL THERAPY 1/> REGIONS: CPT

## 2021-10-05 NOTE — THERAPY TREATMENT NOTE
Outpatient Physical Therapy Ortho Treatment Note  [facility]     Patient Name: Neida Romero  : 1975  MRN: [id number]  Today's Date: 10/5/2021      Visit Date: 10/05/2021    Visit Dx:    ICD-10-CM ICD-9-CM   1. Acute pain of right knee  M25.561 719.46       There is no problem list on file for this patient.       Past Medical History:   Diagnosis Date   • Acid reflux         Past Surgical History:   Procedure Laterality Date   • BREAST SURGERY             PT Assessment/Plan     Row Name 10/05/21 09          PT Assessment    Assessment Comments  Pain primarily at medial knee, as well as distal-medial border of patella.  No specific point tenderness. responds well to ASTYM and taping with decreased pain reported  -LF        PT Plan    PT Plan Comments  cont. per POC  -LF       User Key  (r) = Recorded By, (t) = Taken By, (c) = Cosigned By    Initials Name Provider Type    LF Almaz Gonzalez, PT Physical Therapist            OP Exercises     Row Name 10/05/21 0900 10/05/21 08          Subjective Comments    Subjective Comments  --  Knee is sore and tight.  Taping seemed to help  -LF        Subjective Pain    Able to rate subjective pain?  --  yes  -LF     Pre-Treatment Pain Level  --  4  -LF     Post-Treatment Pain Level  --  2  -LF        Total Minutes    50460 - PT Therapeutic Exercise Minutes  --  -LF  25  -LF     91655 - PT Manual Therapy Minutes  20  -LF  --        Exercise 1    Exercise Name 1  --  NuStepL6  -LF     Time 1  --  5'  -LF        Exercise 2    Exercise Name 2  --  leg press green band (supine from tabletop position)  -LF     Reps 2  --  10 each  -LF        Exercise 3    Exercise Name 3  --  HS curl w/ SB  -LF        Exercise 4    Exercise Name 4  --  bridge with feet on stability ball  -LF     Sets 4  --  2  -LF     Reps 4  --  10  -LF        Exercise 5    Exercise Name 5  --  SLR  -LF     Reps 5  --  10  -LF        Exercise 7    Exercise Name 7  --  lunges onto BOSU  -LF      Reps 7  --  12  -LF        Exercise 8    Exercise Name 8  --  TKE green band  -LF     Reps 8  --  20  -LF        Exercise 9    Exercise Name 9  --  quad and HS stretches w/ strap  -LF     Time 9  --  4'  -LF        Exercise 10    Exercise Name 10  --  calf stretch 1/2 foam roll  -LF     Time 10  --  1'  -LF        Exercise 11    Exercise Name 11  --  heel and toe raises 1/2 foam roll  -LF     Reps 11  --  10  -LF       User Key  (r) = Recorded By, (t) = Taken By, (c) = Cosigned By    Initials Name Provider Type     Almaz Gonzalez PT Physical Therapist              Manual Rx (last 36 hours)      Manual Treatments     Row Name 10/05/21 0900             Total Minutes    43948 - PT Manual Therapy Minutes  20  -LF         Manual Rx 1    Manual Rx 1 Location  R knee  -LF      Manual Rx 1 Type  STM including ASTYM to right anterior and posterior knee including distal quad and HS, gastroc areas.  mild-moderate pressure applied.  Avoid skin abrasion and varicose veing medially  -LF         Manual Rx 2    Manual Rx 2 Location  R knee  -LF      Manual Rx 2 Type  Mconnel taping right knee  -LF        User Key  (r) = Recorded By, (t) = Taken By, (c) = Cosigned By    Initials Name Provider Type     Almaz Gonzalez PT Physical Therapist            Time Calculation:   Start Time: 0900  Timed Charges  94533 - PT Therapeutic Exercise Minutes: 25  57597 - PT Manual Therapy Minutes: 20  Total Minutes  Timed Charges Total Minutes: 20   Total Minutes: 20  Therapy Charges for Today     Code Description Service Date Service Provider Modifiers Qty    45330323568  PT THER PROC EA 15 MIN 10/5/2021 Almaz Gonzalez, PT GP 2    [id number]  PT MANUAL THERAPY EA 15 MIN 10/5/2021 Almaz Gonzalez PT GP 1                    Almaz Gonzalez PT  10/5/2021

## 2021-10-20 ENCOUNTER — LAB (OUTPATIENT)
Dept: LAB | Facility: HOSPITAL | Age: 46
End: 2021-10-20

## 2021-10-20 ENCOUNTER — OFFICE VISIT (OUTPATIENT)
Dept: INTERNAL MEDICINE | Facility: CLINIC | Age: 46
End: 2021-10-20

## 2021-10-20 VITALS
RESPIRATION RATE: 18 BRPM | WEIGHT: 293 LBS | HEART RATE: 83 BPM | BODY MASS INDEX: 60.77 KG/M2 | SYSTOLIC BLOOD PRESSURE: 132 MMHG | OXYGEN SATURATION: 95 % | TEMPERATURE: 97.5 F | DIASTOLIC BLOOD PRESSURE: 100 MMHG

## 2021-10-20 DIAGNOSIS — R82.998 LEUKOCYTES IN URINE: ICD-10-CM

## 2021-10-20 DIAGNOSIS — I10 HYPERTENSION, UNSPECIFIED TYPE: ICD-10-CM

## 2021-10-20 DIAGNOSIS — Z13.1 ENCOUNTER FOR SCREENING EXAMINATION FOR IMPAIRED GLUCOSE REGULATION AND DIABETES MELLITUS: ICD-10-CM

## 2021-10-20 DIAGNOSIS — D22.9 ATYPICAL NEVI: Primary | ICD-10-CM

## 2021-10-20 DIAGNOSIS — Z13.29 THYROID DISORDER SCREEN: ICD-10-CM

## 2021-10-20 DIAGNOSIS — Z13.220 LIPID SCREENING: ICD-10-CM

## 2021-10-20 DIAGNOSIS — Z12.11 COLON CANCER SCREENING: ICD-10-CM

## 2021-10-20 DIAGNOSIS — Z12.31 BREAST CANCER SCREENING BY MAMMOGRAM: ICD-10-CM

## 2021-10-20 DIAGNOSIS — Z23 NEED FOR IMMUNIZATION AGAINST INFLUENZA: ICD-10-CM

## 2021-10-20 LAB
ALBUMIN SERPL-MCNC: 4.2 G/DL (ref 3.5–5.2)
ALBUMIN/GLOB SERPL: 1.3 G/DL
ALP SERPL-CCNC: 64 U/L (ref 39–117)
ALT SERPL W P-5'-P-CCNC: 20 U/L (ref 1–33)
ANION GAP SERPL CALCULATED.3IONS-SCNC: 12.9 MMOL/L (ref 5–15)
AST SERPL-CCNC: 25 U/L (ref 1–32)
BASOPHILS # BLD AUTO: 0.03 10*3/MM3 (ref 0–0.2)
BASOPHILS NFR BLD AUTO: 0.3 % (ref 0–1.5)
BILIRUB BLD-MCNC: NEGATIVE MG/DL
BILIRUB SERPL-MCNC: 0.3 MG/DL (ref 0–1.2)
BUN SERPL-MCNC: 8 MG/DL (ref 6–20)
BUN/CREAT SERPL: 13.8 (ref 7–25)
CALCIUM SPEC-SCNC: 9 MG/DL (ref 8.6–10.5)
CHLORIDE SERPL-SCNC: 101 MMOL/L (ref 98–107)
CHOLEST SERPL-MCNC: 171 MG/DL (ref 0–200)
CLARITY, POC: CLEAR
CO2 SERPL-SCNC: 26.1 MMOL/L (ref 22–29)
COLOR UR: YELLOW
CREAT SERPL-MCNC: 0.58 MG/DL (ref 0.57–1)
DEPRECATED RDW RBC AUTO: 45.1 FL (ref 37–54)
EOSINOPHIL # BLD AUTO: 0.04 10*3/MM3 (ref 0–0.4)
EOSINOPHIL NFR BLD AUTO: 0.5 % (ref 0.3–6.2)
ERYTHROCYTE [DISTWIDTH] IN BLOOD BY AUTOMATED COUNT: 15.7 % (ref 12.3–15.4)
EXPIRATION DATE: ABNORMAL
GFR SERPL CREATININE-BSD FRML MDRD: 111 ML/MIN/1.73
GLOBULIN UR ELPH-MCNC: 3.3 GM/DL
GLUCOSE SERPL-MCNC: 88 MG/DL (ref 65–99)
GLUCOSE UR STRIP-MCNC: NEGATIVE MG/DL
HCT VFR BLD AUTO: 39.9 % (ref 34–46.6)
HDLC SERPL-MCNC: 44 MG/DL (ref 40–60)
HGB BLD-MCNC: 12.1 G/DL (ref 12–15.9)
IMM GRANULOCYTES # BLD AUTO: 0.05 10*3/MM3 (ref 0–0.05)
IMM GRANULOCYTES NFR BLD AUTO: 0.6 % (ref 0–0.5)
KETONES UR QL: NEGATIVE
LDLC SERPL CALC-MCNC: 97 MG/DL (ref 0–100)
LDLC/HDLC SERPL: 2.11 {RATIO}
LEUKOCYTE EST, POC: ABNORMAL
LYMPHOCYTES # BLD AUTO: 1.03 10*3/MM3 (ref 0.7–3.1)
LYMPHOCYTES NFR BLD AUTO: 11.7 % (ref 19.6–45.3)
Lab: ABNORMAL
MCH RBC QN AUTO: 24.6 PG (ref 26.6–33)
MCHC RBC AUTO-ENTMCNC: 30.3 G/DL (ref 31.5–35.7)
MCV RBC AUTO: 81.1 FL (ref 79–97)
MONOCYTES # BLD AUTO: 0.6 10*3/MM3 (ref 0.1–0.9)
MONOCYTES NFR BLD AUTO: 6.8 % (ref 5–12)
NEUTROPHILS NFR BLD AUTO: 7.09 10*3/MM3 (ref 1.7–7)
NEUTROPHILS NFR BLD AUTO: 80.1 % (ref 42.7–76)
NITRITE UR-MCNC: NEGATIVE MG/ML
NRBC BLD AUTO-RTO: 0 /100 WBC (ref 0–0.2)
PH UR: 7 [PH] (ref 5–8)
PLATELET # BLD AUTO: 276 10*3/MM3 (ref 140–450)
PMV BLD AUTO: 10.3 FL (ref 6–12)
POTASSIUM SERPL-SCNC: 4.2 MMOL/L (ref 3.5–5.2)
PROT SERPL-MCNC: 7.5 G/DL (ref 6–8.5)
PROT UR STRIP-MCNC: NEGATIVE MG/DL
RBC # BLD AUTO: 4.92 10*6/MM3 (ref 3.77–5.28)
RBC # UR STRIP: NEGATIVE /UL
SODIUM SERPL-SCNC: 140 MMOL/L (ref 136–145)
SP GR UR: 1.01 (ref 1–1.03)
TRIGL SERPL-MCNC: 171 MG/DL (ref 0–150)
UROBILINOGEN UR QL: NORMAL
VLDLC SERPL-MCNC: 30 MG/DL (ref 5–40)
WBC # BLD AUTO: 8.84 10*3/MM3 (ref 3.4–10.8)

## 2021-10-20 PROCEDURE — 99214 OFFICE O/P EST MOD 30 MIN: CPT | Performed by: NURSE PRACTITIONER

## 2021-10-20 PROCEDURE — 80061 LIPID PANEL: CPT | Performed by: NURSE PRACTITIONER

## 2021-10-20 PROCEDURE — 85025 COMPLETE CBC W/AUTO DIFF WBC: CPT | Performed by: NURSE PRACTITIONER

## 2021-10-20 PROCEDURE — 90471 IMMUNIZATION ADMIN: CPT | Performed by: NURSE PRACTITIONER

## 2021-10-20 PROCEDURE — 87086 URINE CULTURE/COLONY COUNT: CPT | Performed by: NURSE PRACTITIONER

## 2021-10-20 PROCEDURE — 84443 ASSAY THYROID STIM HORMONE: CPT | Performed by: NURSE PRACTITIONER

## 2021-10-20 PROCEDURE — 80053 COMPREHEN METABOLIC PANEL: CPT | Performed by: NURSE PRACTITIONER

## 2021-10-20 PROCEDURE — 81003 URINALYSIS AUTO W/O SCOPE: CPT | Performed by: NURSE PRACTITIONER

## 2021-10-20 PROCEDURE — 90686 IIV4 VACC NO PRSV 0.5 ML IM: CPT | Performed by: NURSE PRACTITIONER

## 2021-10-21 LAB
BACTERIA SPEC AEROBE CULT: NO GROWTH
TSH SERPL DL<=0.05 MIU/L-ACNC: 2.34 UIU/ML (ref 0.27–4.2)

## 2021-10-22 RX ORDER — LISINOPRIL 10 MG/1
10 TABLET ORAL DAILY
Qty: 30 TABLET | Refills: 2 | Status: SHIPPED | OUTPATIENT
Start: 2021-10-22

## 2021-12-15 PROCEDURE — U0004 COV-19 TEST NON-CDC HGH THRU: HCPCS | Performed by: FAMILY MEDICINE

## 2021-12-21 ENCOUNTER — DOCUMENTATION (OUTPATIENT)
Dept: PHYSICAL THERAPY | Facility: HOSPITAL | Age: 46
End: 2021-12-21

## 2021-12-21 DIAGNOSIS — M25.561 ACUTE PAIN OF RIGHT KNEE: Primary | ICD-10-CM

## 2021-12-21 NOTE — THERAPY DISCHARGE NOTE
Outpatient Physical Therapy Discharge Summary         Patient Name: Neida Romero  : 1975  MRN: [id number]    Today's Date: 2021    Visit Dx:    ICD-10-CM ICD-9-CM   1. Acute pain of right knee  M25.561 719.46           OP PT Discharge Summary  Date of Discharge: 21  Discharge Instructions/Additional Comments: Patient was seen for evaluation and 4 visits for treatmen treatment of knee pain.  Did not show for last scheduled appointment, and discharged due to lapse in treatment.  Refer to last visit note for status at d/c.  HEP provided              Almaz Gonzalez, PT  2021

## 2022-01-10 ENCOUNTER — APPOINTMENT (OUTPATIENT)
Dept: MAMMOGRAPHY | Facility: HOSPITAL | Age: 47
End: 2022-01-10

## 2022-01-21 NOTE — TELEPHONE ENCOUNTER
LOV for chronic condition 10/20/2021  NOV N/S    LMOM for patient to call    HUB please inform patient    Patient is due for a fasting annual.  Please advise patient she needs to schedule and keep her appointment to continue to receive refills in the future.  If she is unable to keep her appointment we will not be able to provider further refills.  Once appointment has been scheduled please update message with date and time so we can process the request.  We will forward the message to the provider to review the refill request.

## 2022-01-25 RX ORDER — LISINOPRIL 10 MG/1
TABLET ORAL
Qty: 30 TABLET | Refills: 0 | OUTPATIENT
Start: 2022-01-25

## 2022-01-25 NOTE — TELEPHONE ENCOUNTER
Attempted to contact pt via phone no answer or vm set up unable to leave message for pt to return call to office.